# Patient Record
Sex: MALE | Race: WHITE | Employment: FULL TIME | URBAN - NONMETROPOLITAN AREA
[De-identification: names, ages, dates, MRNs, and addresses within clinical notes are randomized per-mention and may not be internally consistent; named-entity substitution may affect disease eponyms.]

---

## 2020-03-27 RX ORDER — METOPROLOL SUCCINATE 50 MG/1
50 TABLET, EXTENDED RELEASE ORAL DAILY
COMMUNITY

## 2020-03-27 RX ORDER — ROSUVASTATIN CALCIUM 5 MG/1
5 TABLET, COATED ORAL DAILY
COMMUNITY

## 2020-03-27 RX ORDER — TAMSULOSIN HYDROCHLORIDE 0.4 MG/1
0.4 CAPSULE ORAL DAILY
COMMUNITY
Start: 2020-03-19

## 2020-03-27 RX ORDER — FINASTERIDE 5 MG/1
5 TABLET, FILM COATED ORAL DAILY
COMMUNITY

## 2020-03-27 RX ORDER — CELECOXIB 200 MG/1
CAPSULE ORAL
COMMUNITY
Start: 2018-09-20 | End: 2020-11-23

## 2020-03-27 RX ORDER — LISINOPRIL 20 MG/1
20 TABLET ORAL DAILY
COMMUNITY

## 2020-03-27 RX ORDER — IBUPROFEN 400 MG/1
TABLET ORAL
COMMUNITY

## 2020-11-23 ENCOUNTER — OFFICE VISIT (OUTPATIENT)
Dept: PAIN MANAGEMENT | Age: 60
End: 2020-11-23
Payer: COMMERCIAL

## 2020-11-23 VITALS — DIASTOLIC BLOOD PRESSURE: 88 MMHG | SYSTOLIC BLOOD PRESSURE: 145 MMHG | RESPIRATION RATE: 16 BRPM | HEIGHT: 70 IN

## 2020-11-23 PROCEDURE — G8427 DOCREV CUR MEDS BY ELIG CLIN: HCPCS | Performed by: PHYSICAL MEDICINE & REHABILITATION

## 2020-11-23 PROCEDURE — 1036F TOBACCO NON-USER: CPT | Performed by: PHYSICAL MEDICINE & REHABILITATION

## 2020-11-23 PROCEDURE — 99204 OFFICE O/P NEW MOD 45 MIN: CPT | Performed by: PHYSICAL MEDICINE & REHABILITATION

## 2020-11-23 PROCEDURE — 3017F COLORECTAL CA SCREEN DOC REV: CPT | Performed by: PHYSICAL MEDICINE & REHABILITATION

## 2020-11-23 PROCEDURE — G8484 FLU IMMUNIZE NO ADMIN: HCPCS | Performed by: PHYSICAL MEDICINE & REHABILITATION

## 2020-11-23 PROCEDURE — G8421 BMI NOT CALCULATED: HCPCS | Performed by: PHYSICAL MEDICINE & REHABILITATION

## 2020-11-23 ASSESSMENT — ENCOUNTER SYMPTOMS
EYES NEGATIVE: 1
BACK PAIN: 1
NAUSEA: 0
CONSTIPATION: 0
RESPIRATORY NEGATIVE: 1
ALLERGIC/IMMUNOLOGIC NEGATIVE: 1

## 2020-11-23 NOTE — PROGRESS NOTES
PAIN MANAGEMENTNEW PATIENT CONSULTATION  11/23/20    Kiki Chung  1960    ReferringProvider:  No referring provider defined for this encounter. Primary Care Physician:  Mari Petit  [address] [place] [address]    HPIinformation obtained from the patient as well as the Comprehensive Pain ManagementHealth Questionnaire filled out by the patient. The questionnaire will be scannedinto the electronic chart and PMH, PSH, meds, and allergies will be updates accordingly. Subjective:       CHIEF COMPLAINT:  This is a58 y.o. 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 [place]; TENS unit at home)      PAIN HPI:    Pain in R gluteal does not  Radiate      Worse with  Sitting  More than  Walking      notes  Aches  All pain     had L Lumbar radiculopathy 2012  Had discectomy good improvement    Says motrin 600  BID -TID  Works as well as Clelebrex and does get moderate relief  No known Gi upset       Location: R back gluteal   Location Modifier: -  Severity of Pain: 6  Duration of Pain: Intermittent  Frequency of Pain: Intermittent  Aggravating Factors: -, Stretching, Bending, Standing, Walking, Stairs, Exercise, Kneeling and Squatting  Limiting Behavior: no  Relieving Factors: Heat, Cold and Medication -  Result of Injury: no  Work Related Injury: no  [place] of Worker Compensation Case: no      Prior evaluation:    Previous lower back problems:No    Previous workup: No   History of surgery in painful area:No    Previous pain medication trials have included:       Opioids: -     NSAID's:-     Muscle relaxants: -     Neuropathicpain meds: -    Previous Pain Management Physician: No   Nerve blocks, spinal injections:No   Typeof Pain Intervention -.     Date of last Pain Intervention  January /2020   Was there pain relief facility-administered medications prior to visit.         Past Medical History:   Diagnosis Date    Chronic pain     Hearing loss hearing aids in both ears    HTN (hypertension)     Hyperlipidemia     Lumbar herniated disc 2010    Osteoarthritis        Past Surgical History:   Procedure Laterality Date    BACK SURGERY      COLONOSCOPY  2014    HERNIA REPAIR  2017    [id number] [facility] SURGERY  2010    L3, L4 shaved the disc     TONSILLECTOMY AND ADENOIDECTOMY  1965       Family History   Problem Relation Age of Onset    Alzheimer's Disease Mother     Alcohol Abuse Father     Cancer Father     Hypertension Father      Social History     Socioeconomic History    Marital status:      Spouse name: None    Number of children: None    Years of education: None    Highest education level: None   Occupational History    None   Social Needs    Financial resource strain: None    Food insecurity     Worry: None     Inability: None    Transportation needs     Medical: None     Non-medical: None   Tobacco Use    Smoking status: Never Smoker    Smokeless tobacco: Never Used   Substance and Sexual Activity    Alcohol use: Not Currently    Drug use: Never    Sexual activity: None   Lifestyle    Physical activity     Days per week: None     Minutes per session: None    Stress: None   Relationships    Social connections     Talks on phone: None     Gets together: None     Attends Mosque service: None     Active member of club or organization: None     Attends meetings of clubs or organizations: None     Relationship status: None    Intimate partner violence     Fear of current or ex partner: None     Emotionally abused: None     Physically abused: None     Forced sexual activity: None   Other Topics Concern    None   Social History Narrative    None         Objective:     Physical Exam:  Vitals:    11/23/20 1401   BP: (!) 145/88   Site: Left Upper Arm   Position: Sitting   Cuff Size: Large Adult   Resp: 16   Height: 5' 10\" (1.778 m)     Pain Score:   7    Physical Exam  Constitutional:       Appearance: He is well-developed. HENT:      Head: Normocephalic and atraumatic. Cardiovascular:      Pulses: Normal pulses. Comments: Warm extremities. Normal capillary refill. Pulmonary:      Effort: Pulmonary effort is normal.   Abdominal:      General: Abdomen is flat. Palpations: Abdomen is soft. Skin:     General: Skin is warm and dry. Neurological:      General: No focal deficit present. Mental Status: He is alert and oriented to person, place, and time. Cranial Nerves: No cranial nerve deficit. Sensory: No sensory deficit. Motor: No atrophy or abnormal muscle tone. Deep Tendon Reflexes: Reflexes are normal and symmetric. Psychiatric:         Mood and Affect: Mood normal.         Speech: Speech normal.         Behavior: Behavior normal.         Back Exam     Tenderness   The patient is experiencing tenderness in the lumbar. Range of Motion   Extension: normal   Flexion: normal   Lateral bend right: normal   Lateral bend left: normal   Rotation right: normal   Rotation left: normal     Muscle Strength   Right Quadriceps:  5/5   Left Quadriceps:  5/5   Right Hamstrings:  5/5   Left Hamstrings:  5/5     Tests   Straight leg raise right: positive  Straight leg raise left: negative    Other   Toe walk: normal  Heel walk: normal  Sensation: normal  Gait: normal     Comments:  +slump R   mild + vladimir  From L  Abduction to right              Labs: No results found for: WBC, HGB, HCT, PLT, CHOL, TRIG, HDL, LDLDIRECT, ALT, AST, NA, K, CL, CREATININE, BUN, CO2, TSH, PSA, INR, GLUF, LABA1C, LABMICR    Assessment: This is a 63 y.o. male with the following diagnosis:     Pain Diagnoses:  1. Lumbar radiculopathy    2. Bilateral hearing loss, unspecified hearing loss type    3. Lumbosacral spondylosis without myelopathy    4.  Sacroiliac joint dysfunction of right side    5. Lumbar post-laminectomy syndrome        Medical/ Psychological Comorbidities:  As listed in the past medical and surgical history    Functional Limitations secondary to the above problems:  Chronic painlimits function and quality of life    Plan:   R L3,4  TFE x2   continue motrin 600 BID  ? Inversion table    Work is mild  During  Winter  Plans retire 8 months  Modify   Good body mechanics    Core exercise if help   1. Meds:   New Prescriptions    No medications on file      No orders of the defined types were placed in this encounter. Controlled Substances Monitoring:    OARRS report was reviewed for [place], [place]. Pt educated about the risks of taking opiates, including increasedsedation, constipation, slowed breathing, tolerance, dependence, and addiction. No orders of the defined types were placed in this encounter. No follow-ups on file. The patient expressed understanding of the above assessment and plan. Totaltime spent face to face with patient was 35 minutes inwhich  50% or more of the time was spent in counseling, education about risk andbenefits of the above plan, and coordination of care.

## 2020-11-23 NOTE — PATIENT INSTRUCTIONS
the injection done. If they cannot stay, the injection will be rescheduled. The valium may affect your judgment following the procedure and driving a vehicle within 24 hours after the sedation could be dangerous. 6.  Wear simple loose clothing, which can be easily changed. 7.  Leave jewelry (including rings) and other valuables at home. 8.  You will be asked to sign several forms prior to surgery; patients under the age of 25 must have a parent or legal guardian sign the permit to be able to do the procedure. 9.  You must have finished any antibiotic prescribed for recent infections. If required, please take pre-procedure antibiotic or other pre-procedure medications as instructed. 10. Bring inhalers and pain medications with you to your procedure. 11. Bring your MRI/CT films if they were done outside of the [facility]. 12. If you should develop a cold, sore throat, cough, fever or other new indication of illness or infection, or are started on antibiotics within 2 weeks of the scheduled procedure, please notify the [facility] office as early as possible at [phone]. If calling after 4:30pm the day prior to your scheduled procedure please contact [phone] and Leave a Voice Message.

## 2020-12-15 ENCOUNTER — APPOINTMENT (OUTPATIENT)
Dept: GENERAL RADIOLOGY | Age: 60
End: 2020-12-15
Attending: PHYSICAL MEDICINE & REHABILITATION
Payer: COMMERCIAL

## 2020-12-15 ENCOUNTER — HOSPITAL ENCOUNTER (OUTPATIENT)
Age: 60
Setting detail: OUTPATIENT SURGERY
Discharge: HOME | End: 2020-12-15
Attending: PHYSICAL MEDICINE & REHABILITATION | Admitting: PHYSICAL MEDICINE & REHABILITATION
Payer: COMMERCIAL

## 2020-12-15 VITALS
SYSTOLIC BLOOD PRESSURE: 170 MMHG | TEMPERATURE: 98 F | HEIGHT: 70 IN | OXYGEN SATURATION: 98 % | BODY MASS INDEX: 32.21 KG/M2 | HEART RATE: 58 BPM | WEIGHT: 225 LBS | RESPIRATION RATE: 18 BRPM | DIASTOLIC BLOOD PRESSURE: 98 MMHG

## 2020-12-15 PROBLEM — M54.16 LUMBAR RADICULITIS: Status: ACTIVE | Noted: 2020-12-15

## 2020-12-15 PROBLEM — M47.816 LUMBAR SPONDYLOSIS: Status: ACTIVE | Noted: 2020-12-15

## 2020-12-15 PROCEDURE — 2709999900 HC NON-CHARGEABLE SUPPLY: Performed by: PHYSICAL MEDICINE & REHABILITATION

## 2020-12-15 PROCEDURE — 3600000057 HC PAIN LEVEL 4 ADDL 15 MIN: Performed by: PHYSICAL MEDICINE & REHABILITATION

## 2020-12-15 PROCEDURE — 3209999900 FLUORO FOR SURGICAL PROCEDURES

## 2020-12-15 PROCEDURE — 2500000003 HC RX 250 WO HCPCS: Performed by: PHYSICAL MEDICINE & REHABILITATION

## 2020-12-15 PROCEDURE — 6360000004 HC RX CONTRAST MEDICATION: Performed by: PHYSICAL MEDICINE & REHABILITATION

## 2020-12-15 PROCEDURE — 7100000010 HC PHASE II RECOVERY - FIRST 15 MIN: Performed by: PHYSICAL MEDICINE & REHABILITATION

## 2020-12-15 PROCEDURE — 2580000003 HC RX 258: Performed by: PHYSICAL MEDICINE & REHABILITATION

## 2020-12-15 PROCEDURE — 6360000002 HC RX W HCPCS: Performed by: PHYSICAL MEDICINE & REHABILITATION

## 2020-12-15 PROCEDURE — 3600000056 HC PAIN LEVEL 4 BASE: Performed by: PHYSICAL MEDICINE & REHABILITATION

## 2020-12-15 PROCEDURE — 64493 INJ PARAVERT F JNT L/S 1 LEV: CPT | Performed by: PHYSICAL MEDICINE & REHABILITATION

## 2020-12-15 PROCEDURE — 64494 INJ PARAVERT F JNT L/S 2 LEV: CPT | Performed by: PHYSICAL MEDICINE & REHABILITATION

## 2020-12-15 RX ORDER — SODIUM CHLORIDE 9 MG/ML
INJECTION INTRAVENOUS PRN
Status: DISCONTINUED | OUTPATIENT
Start: 2020-12-15 | End: 2020-12-15 | Stop reason: ALTCHOICE

## 2020-12-15 RX ORDER — BUPIVACAINE HYDROCHLORIDE 5 MG/ML
INJECTION, SOLUTION EPIDURAL; INTRACAUDAL PRN
Status: DISCONTINUED | OUTPATIENT
Start: 2020-12-15 | End: 2020-12-15 | Stop reason: ALTCHOICE

## 2020-12-15 RX ORDER — DEXAMETHASONE SODIUM PHOSPHATE 10 MG/ML
INJECTION INTRAMUSCULAR; INTRAVENOUS PRN
Status: DISCONTINUED | OUTPATIENT
Start: 2020-12-15 | End: 2020-12-15 | Stop reason: ALTCHOICE

## 2020-12-15 ASSESSMENT — PAIN - FUNCTIONAL ASSESSMENT: PAIN_FUNCTIONAL_ASSESSMENT: 0-10

## 2020-12-15 NOTE — H&P
.            Signed        Expand AllCollapse All     Show:Clear all  [x]Manual[x]Template[]Copied    Added by:  [x]Eduardo Estevez MD    []Cira for details  PAIN MANAGEMENTNEW PATIENT CONSULTATION  11/23/20     Rae Roxann  1960     ReferringProvider:  No referring provider defined for this encounter. Primary Care Physician:  Shawna Gottlieb  [address] [place] [id number]     HPIinformation obtained from the patient as well as the Comprehensive Pain ManagementHealth Questionnaire filled out by the patient. The questionnaire will be scannedinto the electronic chart and PMH, PSH, meds, and allergies will be updates accordingly.      Subjective:       CHIEF COMPLAINT:  This is a58 y.o. 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 [place]; TENS unit at home)        PAIN HPI:     Pain in R gluteal does not  Radiate      Worse with  Sitting  More than  Walking      notes  Aches  All pain      had L Lumbar radiculopathy 2012  Had discectomy good improvement     Says motrin 600  BID -TID  Works as well as Clelebrex and does get moderate relief  No known Gi upset       Location: R back gluteal   Location Modifier: -  Severity of Pain: 6  Duration of Pain: Intermittent  Frequency of Pain: Intermittent  Aggravating Factors: -, Stretching, Bending, Standing, Walking, Stairs, Exercise, Kneeling and Squatting  Limiting Behavior: no  Relieving Factors: Heat, Cold and Medication -  Result of Injury: no  Work Related Injury: no  [place] of Worker Compensation Case: no        Prior evaluation:               Previous lower back problems:No               Previous workup: No              History of surgery in painful area:No     Previous pain medication trials have included:                             Opioids: - NSAID's:-                 Muscle relaxants: -                 Neuropathicpain meds: -     Previous Pain Management Physician: No              Nerve blocks, spinal injections:No              Typeof Pain Intervention -. Date of last Pain Intervention  January /2020              Was there pain relief from Pain Intervention: No.               How long was your pain relief from the Pain Intervention 2years. Sleep:               Sleep disturbance: No              Difficultyfalling asleep:  No              Feels fatiguedmuch of the time: No              Wakes uprested: No              Awakens in themiddle of the night: No              Takes sleepingmedication: No     Mental health:               Patient feels - secondary to their current pain problems as described above. H/O depression and anxiety: No              Patient is not seeing psychologist orpsychiatrist              Abuse history? No     Employed? Yes  Alcohol use?: No  Tobacco use?: No  Marijuana use?: No  Illicit drug use?: No     Imaging: Reviewed available imagingin our system with the patient. No results found. Referring physician records reviewed. Review of Systems   Constitutional: Positive for fatigue. HENT: Negative. Eyes: Negative. Respiratory: Negative. Cardiovascular: Negative. Gastrointestinal: Negative for constipation and nausea. Endocrine: Negative. Genitourinary: Negative for difficulty urinating. Musculoskeletal: Positive for arthralgias, back pain and myalgias. Skin: Negative. Allergic/Immunologic: Negative. Neurological: Positive for weakness and numbness. Hematological: Negative. Psychiatric/Behavioral: Positive for sleep disturbance. All other systems reviewed and are negative.         No Known Allergies     Medications Prior to Visit          Outpatient Medications Prior to Visit   Medication Sig Dispense Refill    rosuvastatin (CRESTOR) 5 MG tablet Take 5 mg by mouth daily         ibuprofen (ADVIL;MOTRIN) 400 MG tablet 1 tablet with food or milk as needed        lisinopril (PRINIVIL;ZESTRIL) 20 MG tablet Take 20 mg by mouth daily         finasteride (PROSCAR) 5 MG tablet Take 5 mg by mouth daily         metoprolol succinate (TOPROL XL) 50 MG extended release tablet Take 50 mg by mouth daily         tamsulosin (FLOMAX) 0.4 MG capsule Take 0.4 mg by mouth daily         celecoxib (CELEBREX) 200 MG capsule 1 capsule with food          No facility-administered medications prior to visit.              Past Medical History        Past Medical History:   Diagnosis Date    Chronic pain      Hearing loss hearing aids in both ears    HTN (hypertension)      Hyperlipidemia      Lumbar herniated disc 2010    Osteoarthritis              Past Surgical History         Past Surgical History:   Procedure Laterality Date    BACK SURGERY        COLONOSCOPY   2014    HERNIA REPAIR   2017     [id number] [facility] SURGERY   2010     L3, L4 shaved the disc     TONSILLECTOMY AND ADENOIDECTOMY   1965            Family History         Family History   Problem Relation Age of Onset    Alzheimer's Disease Mother      Alcohol Abuse Father      Cancer Father      Hypertension Father           Social History   Social History            Socioeconomic History    Marital status:        Spouse name: None    Number of children: None    Years of education: None    Highest education level: None   Occupational History    None   Social Needs    Financial resource strain: None    Food insecurity       Worry: None       Inability: None    Transportation needs       Medical: None       Non-medical: None   Tobacco Use    Smoking status: Never Smoker    Smokeless tobacco: Never Used   Substance and Sexual Activity    Alcohol use: Not Currently    Drug use: Never    Sexual activity: None   Lifestyle    Physical activity       Days per week: None Minutes per session: None    Stress: None   Relationships    Social connections       Talks on phone: None       Gets together: None       Attends Restorationism service: None       Active member of club or organization: None       Attends meetings of clubs or organizations: None       Relationship status: None    Intimate partner violence       Fear of current or ex partner: None       Emotionally abused: None       Physically abused: None       Forced sexual activity: None   Other Topics Concern    None   Social History Narrative    None               Objective:      Physical Exam:  Vitals       Vitals:     11/23/20 1401   BP: (!) 145/88   Site: Left Upper Arm   Position: Sitting   Cuff Size: Large Adult   Resp: 16   Height: 5' 10\" (1.778 m)         Pain Score:   7     Physical Exam  Constitutional:       Appearance: He is well-developed. HENT:      Head: Normocephalic and atraumatic. Cardiovascular:      Pulses: Normal pulses. Comments: Warm extremities. Normal capillary refill. Pulmonary:      Effort: Pulmonary effort is normal.   Abdominal:      General: Abdomen is flat. Palpations: Abdomen is soft. Skin:     General: Skin is warm and dry. Neurological:      General: No focal deficit present. Mental Status: He is alert and oriented to person, place, and time. Cranial Nerves: No cranial nerve deficit. Sensory: No sensory deficit. Motor: No atrophy or abnormal muscle tone. Deep Tendon Reflexes: Reflexes are normal and symmetric. Psychiatric:         Mood and Affect: Mood normal.         Speech: Speech normal.         Behavior: Behavior normal.            Back Exam      Tenderness   The patient is experiencing tenderness in the lumbar.      Range of Motion   Extension: normal   Flexion: normal   Lateral bend right: normal   Lateral bend left: normal   Rotation right: normal   Rotation left: normal      Muscle Strength   Right Quadriceps:  5/5   Left Quadriceps:  5/5 Right Hamstrings:  5/5   Left Hamstrings:  5/5      Tests   Straight leg raise right: positive  Straight leg raise left: negative     Other   Toe walk: normal  Heel walk: normal  Sensation: normal  Gait: normal      Comments:  +slump R   mild + vladimir  From L  Abduction to right                  Labs: No results found for: WBC, HGB, HCT, PLT, CHOL, TRIG, HDL, LDLDIRECT, ALT, AST, NA, K, CL, CREATININE, BUN, CO2, TSH, PSA, INR, GLUF, LABA1C, LABMICR     Assessment: This is a 63 y.o. male with the following diagnosis:      Pain Diagnoses:  1. Lumbar radiculopathy    2. Bilateral hearing loss, unspecified hearing loss type    3. Lumbosacral spondylosis without myelopathy    4. Sacroiliac joint dysfunction of right side    5. Lumbar post-laminectomy syndrome          Medical/ Psychological Comorbidities:  As listed in the past medical and surgical history     Functional Limitations secondary to the above problems:  Chronic painlimits function and quality of life     Plan:   R L3,4  TFE x2   continue motrin 600 BID  ? Inversion table    Work is mild  During  Winter  Plans retire 8 months  Modify   Good body mechanics    Core exercise if help   1. Meds:       New Prescriptions     No medications on file        Encounter Medications    No orders of the defined types were placed in this encounter. Controlled Substances Monitoring:    OARRS report was reviewed for [place], [place]. Pt educated about the risks of taking opiates, including increasedsedation, constipation, slowed breathing, tolerance, dependence, and addiction. No orders of the defined types were placed in this encounter. No follow-ups on file. The patient expressed understanding of the above assessment and plan. Totaltime spent face to face with patient was 35 minutes inwhich  50% or more of the time was spent in counseling, education about risk andbenefits of the above plan, and coordination of care.

## 2020-12-15 NOTE — OP NOTE
procedure. SEDATION:   No conscious sedation was performed during the procedure. The patient remained awake and conversed throughout the procedure. The patient underwent pulse oximetry and blood pressure monitoring independently by a trained observer, as well as by a physician. PROCEDURE:  Under image-intensifier control, a 22 gauge needle x 5 inch spinal needle was guided successfully into the epidural space employing a posterior lateral/oblique approach. Needle aspiration was negative for heme or CSF. Instillation of  .5 mL of Omnipaque 240 contrast medium opacified the spinal nerve and demonstrated contiguous flow into the RL 3 epidural space. No vascular spread was noted. Digital subtraction was not employed to evaluate for vascular spread. The patient was monitored for any untoward reaction to contrast medium before proceeding with procedure #2. The patient did not report pain reproduction in a concordant distribution. Following needle position verification, a test dose of .5 mL of sterile lidocaine 0.5% was administered and patient monitored for any adverse effects. Then, .5 mL of   was instilled into the epidural space and the patient's response was again monitored. Finally, Dexamethasone (Decadron 10 mg/mL) 1 ml of 0.5% bupivacaine was then instilled. The patient's response was again monitored. The spinal needle was removed. Instillation of  .5 mL of Omnipaque 240 contrast medium opacified the spinal nerve and demonstrated contiguous flow into the RL4 epidural space. No vascular spread was noted. Digital subtraction was not employed to evaluate for vascular spread. The patient was monitored for any untoward reaction to contrast medium before proceeding with procedure #2. The patient did not report pain reproduction in a concordant distribution.     Following needle position verification, a test dose of .5 mL of sterile lidocaine 0.5% was administered and patient monitored for any adverse effects. Then, .5 mL of   was instilled into the epidural space and the patient's response was again monitored. Finally, Dexamethasone (Decadron 10 mg/mL) 1 ml of 0.5% bupivacaine was then instilled. The patient's response was again monitored. The spinal needle was removed. The patient tolerated the procedure well and without complications and was noted to be in stable condition prior to discharge from the procedure center with discharge instructions. EBL: no blood loss    SPECIMEN: none    IMPRESSIONS:  1. RL3,4 transforaminal epidurogram, epidural anesthesia and epidural steroid injection procedures accomplished without incident. RECOMMENDATIONS:  1. Complete and return Post-Procedure Pain and Activity Diary.   2. Contact the [address] for symptom exacerbation, fever or unusual symptoms. 3. Post-procedure care according to verbal and written discharge instructions    POST-PROCEDURE EPIDUROGRAPHY INTERPRETATION:    EXAMINATION: AP, lateral, and oblique views    FLUORO TIME: 23 seconds    DISCUSSION: Spot views of the spine reveal normal alignment and segmentation. Spinal needle is positioned at the RL3,4 neuroforamin. Contrast spreads and outlines the RL3,4 nerve/ neuroforamin and epidural space. The epidurogram reveals excellent contrast flow. Visualized spine reveals See radiology report. Soft tissues reveal no abnormalities. IMPRESSION: RL3,4 transforaminal epidurogram/epineurogram reveals satisfactory needle position and contrast spread.      Electronically signed by La Lopez MD on 12/15/2020 at 8:31 AM

## 2021-01-05 ENCOUNTER — APPOINTMENT (OUTPATIENT)
Dept: GENERAL RADIOLOGY | Age: 61
End: 2021-01-05
Attending: PHYSICAL MEDICINE & REHABILITATION
Payer: COMMERCIAL

## 2021-01-05 ENCOUNTER — HOSPITAL ENCOUNTER (OUTPATIENT)
Age: 61
Setting detail: OUTPATIENT SURGERY
Discharge: HOME | End: 2021-01-05
Attending: PHYSICAL MEDICINE & REHABILITATION | Admitting: PHYSICAL MEDICINE & REHABILITATION
Payer: COMMERCIAL

## 2021-01-05 VITALS
HEART RATE: 65 BPM | DIASTOLIC BLOOD PRESSURE: 74 MMHG | SYSTOLIC BLOOD PRESSURE: 140 MMHG | TEMPERATURE: 98.2 F | WEIGHT: 225 LBS | BODY MASS INDEX: 32.21 KG/M2 | RESPIRATION RATE: 16 BRPM | OXYGEN SATURATION: 98 % | HEIGHT: 70 IN

## 2021-01-05 PROCEDURE — 6360000004 HC RX CONTRAST MEDICATION: Performed by: PHYSICAL MEDICINE & REHABILITATION

## 2021-01-05 PROCEDURE — 64483 NJX AA&/STRD TFRM EPI L/S 1: CPT | Performed by: PHYSICAL MEDICINE & REHABILITATION

## 2021-01-05 PROCEDURE — 64484 NJX AA&/STRD TFRM EPI L/S EA: CPT | Performed by: PHYSICAL MEDICINE & REHABILITATION

## 2021-01-05 PROCEDURE — 7100000011 HC PHASE II RECOVERY - ADDTL 15 MIN: Performed by: PHYSICAL MEDICINE & REHABILITATION

## 2021-01-05 PROCEDURE — 2709999900 HC NON-CHARGEABLE SUPPLY: Performed by: PHYSICAL MEDICINE & REHABILITATION

## 2021-01-05 PROCEDURE — 2500000003 HC RX 250 WO HCPCS: Performed by: PHYSICAL MEDICINE & REHABILITATION

## 2021-01-05 PROCEDURE — 3209999900 FLUORO FOR SURGICAL PROCEDURES

## 2021-01-05 PROCEDURE — 2580000003 HC RX 258: Performed by: PHYSICAL MEDICINE & REHABILITATION

## 2021-01-05 PROCEDURE — 6360000002 HC RX W HCPCS: Performed by: PHYSICAL MEDICINE & REHABILITATION

## 2021-01-05 PROCEDURE — 3600000056 HC PAIN LEVEL 4 BASE: Performed by: PHYSICAL MEDICINE & REHABILITATION

## 2021-01-05 PROCEDURE — 7100000010 HC PHASE II RECOVERY - FIRST 15 MIN: Performed by: PHYSICAL MEDICINE & REHABILITATION

## 2021-01-05 RX ORDER — SODIUM CHLORIDE 9 MG/ML
INJECTION INTRAVENOUS PRN
Status: DISCONTINUED | OUTPATIENT
Start: 2021-01-05 | End: 2021-01-05 | Stop reason: ALTCHOICE

## 2021-01-05 RX ORDER — DEXAMETHASONE SODIUM PHOSPHATE 10 MG/ML
INJECTION INTRAMUSCULAR; INTRAVENOUS PRN
Status: DISCONTINUED | OUTPATIENT
Start: 2021-01-05 | End: 2021-01-05 | Stop reason: ALTCHOICE

## 2021-01-05 RX ORDER — BUPIVACAINE HYDROCHLORIDE 5 MG/ML
INJECTION, SOLUTION EPIDURAL; INTRACAUDAL PRN
Status: DISCONTINUED | OUTPATIENT
Start: 2021-01-05 | End: 2021-01-05 | Stop reason: ALTCHOICE

## 2021-01-05 ASSESSMENT — PAIN SCALES - GENERAL: PAINLEVEL_OUTOF10: 0

## 2021-01-05 NOTE — OP NOTE
Operative Note  TRANSFORAMINAL EPIDURAL STEROID INJECTION    1/5/21  The patient was counseled at length about the risks of nehemias Covid-19 during their perioperative period and any recovery window from their procedure. The patient was made aware that nehemias Covid-19  may worsen their prognosis for recovering from their procedure  and lend to a higher morbidity and/or mortality risk. All material risks, benefits, and reasonable alternatives including postponing the procedure were discussed. The patient does wish to proceed with the procedure at this time. Surgeon: Nicho Cunningham MD    Pre-operative Diagnosis:   Patient Active Problem List   Diagnosis Code    Hearing loss H91.90    Lumbar radiculitis M54.16    Lumbar spondylosis M47.816       Post-operative Diagnosis: Same    Assistants: none    This is a 63 y.o. male patient with pain in the Back, right leg. Previous treatment and examination findings are noted in the H&P.RL3,4 transforaminal epidural injection has been requested for diagnostic and therapeutic reasons. Conservative treatment was ineffective i.e.: ice, NSAIDS, rest, narcotic medication, chiropractic care, physical therapy and message therapy. Patient is unable to perform the following ADL's: ambulating and grooming     Pain Assessment: 0-10  Pain Level: 4                Last Plain films: 202020      EXAMINATION:  1. RL3,4 transforaminal radiculogram/epidurogram.   2. RL3,4 transforaminal epidural anesthetic injection. 3. RL3,4 transforaminal epidural steroid injection. CONSENT: Written consent was obtained from the patient on preprinted consent form after explaining the procedure, indications, potential complications and outcomes. Alternative treatments were also discussed. DISCUSSION: The patient was sterilely prepped and draped in the usual fashion in the prone position. Time out was verified for correct patient, side, level and procedure.     SEDATION:   No conscious sedation was performed during the procedure. The patient remained awake and conversed throughout the procedure. The patient underwent pulse oximetry and blood pressure monitoring independently by a trained observer, as well as by a physician. PROCEDURE:  Under image-intensifier control, a 22 gauge needle x 5 inch spinal needle was guided successfully into the epidural space employing a posterior lateral/oblique approach. Needle aspiration was negative for heme or CSF. Instillation of  .5 mL of Omnipaque 240 contrast medium opacified the spinal nerve and demonstrated contiguous flow into theRL 3 epidural space. No vascular spread was noted. Digital subtraction was not employed to evaluate for vascular spread. The patient was monitored for any untoward reaction to contrast medium before proceeding with procedure #2. The patient did not report pain reproduction in a concordant distribution. Following needle position verification, a test dose of .5 mL of sterile lidocaine 0.5% was administered and patient monitored for any adverse effects. Then, 1 mL of   was instilled into the epidural space and the patient's response was again monitored. Finally, Dexamethasone (Decadron 10 mg/mL) 1 ml of 0.5% bupivacaine was then instilled. The patient's response was again monitored. The spinal needle was removed. Instillation of  .5 mL of Omnipaque 240 contrast medium opacified the spinal nerve and demonstrated contiguous flow into theRL4  epidural space. No vascular spread was noted. Digital subtraction was not employed to evaluate for vascular spread. The patient was monitored for any untoward reaction to contrast medium before proceeding with procedure #2. The patient did not report pain reproduction in a concordant distribution. Following needle position verification, a test dose of .5 mL of sterile lidocaine 0.5% was administered and patient monitored for any adverse effects.  Then, 1 mL of was instilled into the epidural space and the patient's response was again monitored. Finally, Dexamethasone (Decadron 10 mg/mL) 1 ml of 0.5% bupivacaine was then instilled. The patient's response was again monitored. The spinal needle was removed. The patient tolerated the procedure well and without complications and was noted to be in stable condition prior to discharge from the procedure center with discharge instructions. EBL: no blood loss    SPECIMEN: none    IMPRESSIONS:  1. RL3,4 transforaminal epidurogram, epidural anesthesia and epidural steroid injection procedures accomplished without incident. RECOMMENDATIONS:  1. Complete and return Post-Procedure Pain and Activity Diary.   2. Contact the [address] for symptom exacerbation, fever or unusual symptoms. 3. Post-procedure care according to verbal and written discharge instructions    POST-PROCEDURE EPIDUROGRAPHY INTERPRETATION:    EXAMINATION: AP, lateral, and oblique views    FLUORO TIME: 12 seconds    DISCUSSION: Spot views of the spine reveal normal alignment and segmentation. Spinal needle is positioned at the RL3,4 neuroforamin. Contrast spreads and outlines the RL3,4 nerve/ neuroforamin and epidural space. The epidurogram reveals excellent contrast flow. Visualized spine reveals See radiology report. Soft tissues reveal no abnormalities. IMPRESSION: RL3,4 transforaminal epidurogram/epineurogram reveals satisfactory needle position and contrast spread.      Electronically signed by Ashanti Petit MD on 1/5/2021 at 8:11 AM

## 2021-01-05 NOTE — INTERVAL H&P NOTE
I have interviewed and examined the patient and reviewed the recent History and Physical.  There have been no changes to the recent H&P documentation. The surgical consent form has been signed. Last anticoagulant medication use was:na    Premedication taken for contrast allergy? No    Valium taken for oral sedation? No    Outpatient Medications Marked as Taking for the 1/5/21 encounter [facility] Encounter)   Medication Sig Dispense Refill    rosuvastatin (CRESTOR) 5 MG tablet Take 5 mg by mouth daily       lisinopril (PRINIVIL;ZESTRIL) 20 MG tablet Take 20 mg by mouth daily       finasteride (PROSCAR) 5 MG tablet Take 5 mg by mouth daily       metoprolol succinate (TOPROL XL) 50 MG extended release tablet Take 50 mg by mouth daily       tamsulosin (FLOMAX) 0.4 MG capsule Take 0.4 mg by mouth daily          The patient understands the planned operation and its associated risks and benefits and agrees to proceed.         Electronically signed by Amilcar Damon MD on 1/5/2021 at 8:10 AM

## 2021-01-05 NOTE — H&P
.            Signed        Expand AllCollapse All     Show:Clear all  [x]Manual[x]Template[]Copied    Added by:  [x]Eduardo Guillen MD    []Cira for details  I have interviewed and examined the patient and reviewed the recent History and Physical.  There have been no changes to the recent H&P documentation. The surgical consent form has been signed. Last anticoagulant medication use was:na     Premedication taken for contrast allergy? No     Valium taken for oral sedation? No     Active Medications          Outpatient Medications Marked as Taking for the 12/15/20 encounter [facility] Encounter)   Medication Sig Dispense Refill    rosuvastatin (CRESTOR) 5 MG tablet Take 5 mg by mouth daily         ibuprofen (ADVIL;MOTRIN) 400 MG tablet 1 tablet with food or milk as needed        lisinopril (PRINIVIL;ZESTRIL) 20 MG tablet Take 20 mg by mouth daily         finasteride (PROSCAR) 5 MG tablet Take 5 mg by mouth daily         metoprolol succinate (TOPROL XL) 50 MG extended release tablet Take 50 mg by mouth daily         tamsulosin (FLOMAX) 0.4 MG capsule Take 0.4 mg by mouth daily                 The patient understands the planned operation and its associated risks and benefits and agrees to proceed. Electronically signed by Nicho Cunningham MD on 12/15/2020 at 8:30 AM                Nicho Cunningham MD   Physician   Specialty:  Physical Medicine and Rehab   H&P   Signed   Date of Service:  12/15/2020  8:29 AM         Related encounter: Admission (Discharged) from 12/15/2020 in [address]  all  [x]Manual[]Template[x]Copied    Added by:  [x]Eduardo Guillen MD    []Cira for details      . Signed          Expand AllCollapse All     Show:Clear all  [x]? Manual[x]? Template[]? Copied     Added by:  [x]? Nicho Cunningham MD     []? Cira for details  PAIN MANAGEMENTNEW PATIENT CONSULTATION  11/23/20     Neyda Rod  1960     ReferringProvider:  No referring provider defined for this encounter. Primary Care Physician:  Ty Harris  [address] [address]     HPIinformation obtained from the patient as well as the Comprehensive Pain ManagementHealth Questionnaire filled out by the patient. The questionnaire will be scannedinto the electronic chart and PMH, PSH, meds, and allergies will be updates accordingly. Subjective:       CHIEF COMPLAINT:  This is a58 y.o. male patientwho presents with a complaint of New Patient (right side low back pain, referred by Dr Paulette Rome) and Other (over past two years twice @ [place] [address], did dry needling as well, chiropractor at [facility] in [place]; TENS unit at home)        PAIN HPI:     Pain in R gluteal does not  Radiate      Worse with  Sitting  More than  Walking      notes  Aches  All pain      had L Lumbar radiculopathy 2012  Had discectomy good improvement     Says motrin 600  BID -TID  Works as well as Clelebrex and does get moderate relief  No known Gi upset       Location: R back gluteal   Location Modifier: -  Severity of Pain: 6  Duration of Pain: Intermittent  Frequency of Pain: Intermittent  Aggravating Factors: -, Stretching, Bending, Standing, Walking, Stairs, Exercise, Kneeling and Squatting  Limiting Behavior: no  Relieving Factors: Heat, Cold and Medication -  Result of Injury: no  Work Related Injury: no  Glynn of Worker Compensation Case: no        Prior evaluation:               Previous lower back problems:No               Previous workup: No              History of surgery in painful area:No     Previous pain medication trials have included:                             Opioids: -                 NSAID's:-                 Muscle relaxants: -                 Neuropathicpain meds: -     Previous Pain Management Physician: No              Nerve blocks, spinal injections:No              Typeof Pain Intervention -.                Date of last Pain Intervention  January /2020              Was there pain relief from Pain Intervention: No.               How long was your pain relief from the Pain Intervention 2years. Sleep:               Sleep disturbance: No              Difficultyfalling asleep:  No              Feels fatiguedmuch of the time: No              Wakes uprested: No              Awakens in themiddle of the night: No              Takes sleepingmedication: No     Mental health:               Patient feels - secondary to their current pain problems as described above. H/O depression and anxiety: No              Patient is not seeing psychologist orpsychiatrist              Abuse history? No     Employed? Yes  Alcohol use?: No  Tobacco use?: No  Marijuana use?: No  Illicit drug use?: No     Imaging: Reviewed available imagingin our system with the patient. No results found. Referring physician records reviewed. Review of Systems   Constitutional: Positive for fatigue. HENT: Negative. Eyes: Negative. Respiratory: Negative. Cardiovascular: Negative. Gastrointestinal: Negative for constipation and nausea. Endocrine: Negative. Genitourinary: Negative for difficulty urinating. Musculoskeletal: Positive for arthralgias, back pain and myalgias. Skin: Negative. Allergic/Immunologic: Negative. Neurological: Positive for weakness and numbness. Hematological: Negative. Psychiatric/Behavioral: Positive for sleep disturbance. All other systems reviewed and are negative.         No Known Allergies     Medications Prior to Visit               Outpatient Medications Prior to Visit   Medication Sig Dispense Refill    rosuvastatin (CRESTOR) 5 MG tablet Take 5 mg by mouth daily         ibuprofen (ADVIL;MOTRIN) 400 MG tablet 1 tablet with food or milk as needed        lisinopril (PRINIVIL;ZESTRIL) 20 MG tablet Take 20 mg by mouth daily         finasteride (PROSCAR) 5 MG tablet Take 5 mg by mouth daily         metoprolol succinate (TOPROL XL) 50 MG extended release tablet Take 50 mg by mouth daily         tamsulosin (FLOMAX) 0.4 MG capsule Take 0.4 mg by mouth daily         celecoxib (CELEBREX) 200 MG capsule 1 capsule with food          No facility-administered medications prior to visit.              Past Medical History           Past Medical History:   Diagnosis Date    Chronic pain      Hearing loss hearing aids in both ears    HTN (hypertension)      Hyperlipidemia      Lumbar herniated disc 2010    Osteoarthritis              Past Surgical History             Past Surgical History:   Procedure Laterality Date    BACK SURGERY        COLONOSCOPY   2014    HERNIA REPAIR   2017     [id number] [facility] SURGERY   2010     L3, L4 shaved the disc     TONSILLECTOMY AND ADENOIDECTOMY   1965            Family History             Family History   Problem Relation Age of Onset    Alzheimer's Disease Mother      Alcohol Abuse Father      Cancer Father      Hypertension Father           Social History   Social History                Socioeconomic History    Marital status:        Spouse name: None    Number of children: None    Years of education: None    Highest education level: None   Occupational History    None   Social Needs    Financial resource strain: None    Food insecurity       Worry: None       Inability: None    Transportation needs       Medical: None       Non-medical: None   Tobacco Use    Smoking status: Never Smoker    Smokeless tobacco: Never Used   Substance and Sexual Activity    Alcohol use: Not Currently    Drug use: Never    Sexual activity: None   Lifestyle    Physical activity       Days per week: None       Minutes per session: None    Stress: None   Relationships    Social connections       Talks on phone: None       Gets together: None       Attends Adventism service: None       Active member of club or organization: None       Attends meetings of vladimir  From L  Abduction to right                  Labs: No results found for: WBC, HGB, HCT, PLT, CHOL, TRIG, HDL, LDLDIRECT, ALT, AST, NA, K, CL, CREATININE, BUN, CO2, TSH, PSA, INR, GLUF, LABA1C, LABMICR     Assessment: This is a 63 y.o. male with the following diagnosis:      Pain Diagnoses:  1. Lumbar radiculopathy    2. Bilateral hearing loss, unspecified hearing loss type    3. Lumbosacral spondylosis without myelopathy    4. Sacroiliac joint dysfunction of right side    5. Lumbar post-laminectomy syndrome          Medical/ Psychological Comorbidities:  As listed in the past medical and surgical history     Functional Limitations secondary to the above problems:  Chronic painlimits function and quality of life     Plan:   R L3,4  TFE x2   continue motrin 600 BID  ? Inversion table    Work is mild  During  Winter  Plans retire 8 months  Modify   Good body mechanics    Core exercise if help   1. Meds:         New Prescriptions     No medications on file         Encounter Medications    No orders of the defined types were placed in this encounter. Controlled Substances Monitoring:    OARRS report was reviewed for [place], [place]. Pt educated about the risks of taking opiates, including increasedsedation, constipation, slowed breathing, tolerance, dependence, and addiction. No orders of the defined types were placed in this encounter. No follow-ups on file. The patient expressed understanding of the above assessment and plan. Totaltime spent face to face with patient was 35 minutes inwhich  50% or more of the time was spent in counseling, education about risk andbenefits of the above plan, and coordination of care.

## 2021-01-19 ENCOUNTER — OFFICE VISIT (OUTPATIENT)
Dept: PAIN MANAGEMENT | Age: 61
End: 2021-01-19
Payer: COMMERCIAL

## 2021-01-19 VITALS
WEIGHT: 230 LBS | OXYGEN SATURATION: 97 % | HEIGHT: 70 IN | DIASTOLIC BLOOD PRESSURE: 80 MMHG | SYSTOLIC BLOOD PRESSURE: 122 MMHG | BODY MASS INDEX: 32.93 KG/M2 | HEART RATE: 73 BPM

## 2021-01-19 DIAGNOSIS — M47.816 LUMBAR FACET JOINT SYNDROME: ICD-10-CM

## 2021-01-19 DIAGNOSIS — M54.16 LUMBAR RADICULOPATHY: Primary | ICD-10-CM

## 2021-01-19 DIAGNOSIS — M47.817 LUMBOSACRAL SPONDYLOSIS WITHOUT MYELOPATHY: ICD-10-CM

## 2021-01-19 PROCEDURE — G8484 FLU IMMUNIZE NO ADMIN: HCPCS | Performed by: PHYSICAL MEDICINE & REHABILITATION

## 2021-01-19 PROCEDURE — G8427 DOCREV CUR MEDS BY ELIG CLIN: HCPCS | Performed by: PHYSICAL MEDICINE & REHABILITATION

## 2021-01-19 PROCEDURE — 1036F TOBACCO NON-USER: CPT | Performed by: PHYSICAL MEDICINE & REHABILITATION

## 2021-01-19 PROCEDURE — G8417 CALC BMI ABV UP PARAM F/U: HCPCS | Performed by: PHYSICAL MEDICINE & REHABILITATION

## 2021-01-19 PROCEDURE — 3017F COLORECTAL CA SCREEN DOC REV: CPT | Performed by: PHYSICAL MEDICINE & REHABILITATION

## 2021-01-19 PROCEDURE — 99214 OFFICE O/P EST MOD 30 MIN: CPT | Performed by: PHYSICAL MEDICINE & REHABILITATION

## 2021-01-19 ASSESSMENT — ENCOUNTER SYMPTOMS
EYES NEGATIVE: 1
ALLERGIC/IMMUNOLOGIC NEGATIVE: 1
BACK PAIN: 1
NAUSEA: 0
CONSTIPATION: 0
RESPIRATORY NEGATIVE: 1

## 2021-01-19 NOTE — PROGRESS NOTES
PAIN MANAGEMENT FOLLOW-UP NOTE  1/19/21    CHIEF COMPLAINT: This is a58 y.o. male patientwho returns to the [facility] Management Clinic with a history of Lower Back Pain (post TFE, lower right sided back pain. pt states pain has decreased since prior to injections )      PAIN HPI:Vincent Potter returns today for  reevaluation. Since the visit, the patient reports that the pain is not changed. 40 % better in terms of R lumbar pain    The  Actual spine  Pain is  Gone completely. Location: Back  Location Modifier: -  Severity of Pain: 2-  Duration of Pain: Intermittent  Frequency of Pain: Intermittent  Aggravating Factors: -  Limiting Behavior: Standing   Relieving Factors: relaxation        Previous pain medication trials have included:          Mental health:    Patient feels - secondary to their current pain problems as described above. H/O depression and anxiety: No   Patient is not seeing psychologist orpsychiatrist   Abuse history? No    Employed? No    ANALGESIA:   Are your Current Pain medication (s) helping to decrease pain? No.   Current Pain score:      ADVERSE AFFECTS:   Medication Side Effects: No.    ACTIVITY:  Are you able to be more active with your pain medications? No      ABERRANT BEHAVIORS SINCE LAST VISIT? No    Review of Systems   Constitutional: Positive for fatigue. HENT: Negative. Eyes: Negative. Respiratory: Negative. Cardiovascular: Negative. Gastrointestinal: Negative for constipation and nausea. Endocrine: Negative. Genitourinary: Negative for difficulty urinating. Musculoskeletal: Positive for arthralgias, back pain and myalgias. Skin: Negative. Allergic/Immunologic: Negative. Neurological: Positive for weakness and numbness. Hematological: Negative. Psychiatric/Behavioral: Positive for sleep disturbance. All other systems reviewed and are negative.        No Known Allergies    Outpatient Medications Prior to Visit   Medication Sig Dispense Refill  rosuvastatin (CRESTOR) 5 MG tablet Take 5 mg by mouth daily       ibuprofen (ADVIL;MOTRIN) 400 MG tablet 1 tablet with food or milk as needed      lisinopril (PRINIVIL;ZESTRIL) 20 MG tablet Take 20 mg by mouth daily       finasteride (PROSCAR) 5 MG tablet Take 5 mg by mouth daily       metoprolol succinate (TOPROL XL) 50 MG extended release tablet Take 50 mg by mouth daily       tamsulosin (FLOMAX) 0.4 MG capsule Take 0.4 mg by mouth daily        No facility-administered medications prior to visit.         Past Medical History:   Diagnosis Date    Chronic pain     Hearing loss hearing aids in both ears    HTN (hypertension)     Hyperlipidemia     Lumbar herniated disc 2010    Osteoarthritis        Past Surgical History:   Procedure Laterality Date    BACK SURGERY      COLONOSCOPY  2014   [facility] HERNIA REPAIR  2017    [id number] [facility] SURGERY  2010    L3, L4 shaved the disc     PAIN MANAGEMENT PROCEDURE Right 12/15/2020    Right L3 L4 TRANSFORAMINAL performed by Lavonne Tidwell MD at [address] [place] [address] Right 1/5/2021    Right L3 L4 TRANSFORAMINAL performed by Lavonen Tidwell MD at [place] [address]     Family History   Problem Relation Age of Onset    Alzheimer's Disease Mother     Alcohol Abuse Father     Cancer Father     Hypertension Father      Social History     Socioeconomic History    Marital status:      Spouse name: None    Number of children: None    Years of education: None    Highest education level: None   Occupational History    None   Social Needs    Financial resource strain: None    Food insecurity     Worry: None     Inability: None    Transportation needs     Medical: None     Non-medical: None   Tobacco Use    Smoking status: Never Smoker    Smokeless tobacco: Never Used   Substance and Sexual Activity    Alcohol use: Not Currently    Drug use: Never    Sexual activity: None Lifestyle    Physical activity     Days per week: None     Minutes per session: None    Stress: None   Relationships    Social connections     Talks on phone: None     Gets together: None     Attends Gnosticism service: None     Active member of club or organization: None     Attends meetings of clubs or organizations: None     Relationship status: None    Intimate partner violence     Fear of current or ex partner: None     Emotionally abused: None     Physically abused: None     Forced sexual activity: None   Other Topics Concern    None   Social History Narrative    None         Family and Social Historyreviewed in the electronic medical record. Imaging:Reviewed available imaging in our system with the patient. No results found. Objective:     Physical Exam:  Vitals:    01/19/21 1333   BP: 122/80   Pulse: 73   SpO2: 97%   Weight: 230 lb (104.3 kg)   Height: 5' 10\" (1.778 m)          Physical Exam  Constitutional:       Appearance: He is well-developed. HENT:      Head: Normocephalic and atraumatic. Cardiovascular:      Pulses: Normal pulses. Comments: Warm extremities. Normal capillary refill. Pulmonary:      Effort: Pulmonary effort is normal.   Abdominal:      General: Abdomen is flat. Palpations: Abdomen is soft. Skin:     General: Skin is warm and dry. Neurological:      Mental Status: He is alert and oriented to person, place, and time. Cranial Nerves: No cranial nerve deficit. Sensory: No sensory deficit. Motor: No atrophy or abnormal muscle tone. Deep Tendon Reflexes: Reflexes are normal and symmetric. Psychiatric:         Speech: Speech normal.         Behavior: Behavior normal.       Back Exam     Tenderness   The patient is experiencing tenderness in the lumbar.     Range of Motion   Extension: normal   Flexion: normal   Lateral bend right: normal   Lateral bend left: normal   Rotation right: normal   Rotation left: normal     Muscle Strength Right Quadriceps:  5/5   Left Quadriceps:  5/5   Right Hamstrings:  5/5   Left Hamstrings:  5/5     Tests   Straight leg raise right: positive  Straight leg raise left: negative    Other   Toe walk: normal  Heel walk: normal  Sensation: normal  Gait: normal     Comments:  +kemps right  -fabers  Sensory intact BLE                                     Assessment: This is a 63 y.o. male patient with:    Diagnosis:   Diagnosis Orders   1. Lumbar radiculopathy     2. Lumbosacral spondylosis without myelopathy         Medical Comorbidities:  As listed in the patient's past medical and surgical history    Functional Limitations:   Pain limits function and quality of life. Plan:   advil 600 TID 3 weeks   call if  R lumbar worsens can treat R L3,4    has some R lumbar facet based pain as well    Meds:   Controlled Substances Monitoring: Pt educated about the risks of taking opiates,including increased sedation, constipation, slowed breathing, tolerance, dependence,and addiction. New Prescriptions    No medications on file      No orders of the defined types were placed in this encounter. No orders of the defined types were placed in this encounter. No follow-ups on file. The patient expressed understanding of the above assessment and plan. Totaltime spent face to face with patient was 25 minutes inwhich  50% or more of the time was spent in counseling, education about risk andbenefits of the above plan, and coordination of care.

## 2021-03-01 ENCOUNTER — TELEPHONE (OUTPATIENT)
Dept: PAIN MANAGEMENT | Age: 61
End: 2021-03-01

## 2021-03-01 ENCOUNTER — OFFICE VISIT (OUTPATIENT)
Dept: PAIN MANAGEMENT | Age: 61
End: 2021-03-01
Payer: COMMERCIAL

## 2021-03-01 VITALS
HEIGHT: 70 IN | BODY MASS INDEX: 33.13 KG/M2 | DIASTOLIC BLOOD PRESSURE: 94 MMHG | SYSTOLIC BLOOD PRESSURE: 140 MMHG | WEIGHT: 231.4 LBS | RESPIRATION RATE: 14 BRPM

## 2021-03-01 DIAGNOSIS — M54.16 LUMBAR RADICULOPATHY: ICD-10-CM

## 2021-03-01 DIAGNOSIS — M47.816 LUMBAR FACET JOINT SYNDROME: ICD-10-CM

## 2021-03-01 DIAGNOSIS — M47.817 LUMBOSACRAL SPONDYLOSIS WITHOUT MYELOPATHY: ICD-10-CM

## 2021-03-01 DIAGNOSIS — M54.16 LEFT LUMBAR RADICULOPATHY: Primary | ICD-10-CM

## 2021-03-01 PROCEDURE — 3017F COLORECTAL CA SCREEN DOC REV: CPT | Performed by: PHYSICAL MEDICINE & REHABILITATION

## 2021-03-01 PROCEDURE — G8484 FLU IMMUNIZE NO ADMIN: HCPCS | Performed by: PHYSICAL MEDICINE & REHABILITATION

## 2021-03-01 PROCEDURE — 99214 OFFICE O/P EST MOD 30 MIN: CPT | Performed by: PHYSICAL MEDICINE & REHABILITATION

## 2021-03-01 PROCEDURE — 1036F TOBACCO NON-USER: CPT | Performed by: PHYSICAL MEDICINE & REHABILITATION

## 2021-03-01 PROCEDURE — G8427 DOCREV CUR MEDS BY ELIG CLIN: HCPCS | Performed by: PHYSICAL MEDICINE & REHABILITATION

## 2021-03-01 PROCEDURE — G8417 CALC BMI ABV UP PARAM F/U: HCPCS | Performed by: PHYSICAL MEDICINE & REHABILITATION

## 2021-03-01 RX ORDER — ACETAMINOPHEN 500 MG
1000 TABLET ORAL EVERY 4 HOURS PRN
COMMUNITY

## 2021-03-01 ASSESSMENT — ENCOUNTER SYMPTOMS
EYES NEGATIVE: 1
ALLERGIC/IMMUNOLOGIC NEGATIVE: 1
BACK PAIN: 1
RESPIRATORY NEGATIVE: 1
NAUSEA: 0
CONSTIPATION: 0

## 2021-03-01 NOTE — PROGRESS NOTES
PAIN MANAGEMENT FOLLOW-UP NOTE  3/1/21    CHIEF COMPLAINT: This is a58 y.o. male patientwho [facility] the [facility] with a history of Injections (were done in [place] and it didn't seem to help alot) and Lower Back Pain (feel like a knife in his left buttcheck, pain radiates down to toes but also has a pain in the mid calf in the back)      PAIN HPI:Vincent Lora returns today for  reevaluation. Since the visit, the patient reports that the pain is not changed. New L leg pain  Posterior 2 weeks  Ago   Sitting on tractor  But no injury  Has had two epidural injections  Previously B lumbar and to R lumbar pain   Got some  Better in 1 mon  Last month   Some weakness  Taking tylenol advil not helping   Criropractor twice in last 2 weeks   tried to  Do ball exercises on own last 2 weeks no help           Location: Back  Location Modifier: left lower back  Severity of Pain: 4  Duration of Pain: Intermittent  Frequency of Pain: Intermittent  Aggravating Factors: -  Limiting Behavior: -  Relieving Factors: relaxation        Previous pain medication trials have included:          Mental health:    Patient feels - secondary to their current pain problems as described above. H/O depression and anxiety: No   Patient . seeing psychologist orpsychiatrist   Abuse history? No    Employed? No    ANALGESIA:   Are your Current Pain medication (s) helping to decrease pain? No.   Current Pain score: Pain Score:   9    ADVERSE AFFECTS:   Medication Side Effects: No.    ACTIVITY:  Are you able to be more active with your pain medications? No      ABERRANT BEHAVIORS SINCE LAST VISIT? No    Review of Systems   Constitutional: Positive for fatigue. HENT: Negative. Eyes: Negative. Respiratory: Negative. Cardiovascular: Negative. Gastrointestinal: Negative for constipation and nausea. Endocrine: Negative. Genitourinary: Negative for difficulty urinating. Spouse name: None    Number of children: None    Years of education: None    Highest education level: None   Occupational History    None   Social Needs    Financial resource strain: None    Food insecurity     Worry: None     Inability: None    Transportation needs     Medical: None     Non-medical: None   Tobacco Use    Smoking status: Never Smoker    Smokeless tobacco: Never Used   Substance and Sexual Activity    Alcohol use: Not Currently    Drug use: Never    Sexual activity: None   Lifestyle    Physical activity     Days per week: None     Minutes per session: None    Stress: None   Relationships    Social connections     Talks on phone: None     Gets together: None     Attends Sabianism service: None     Active member of club or organization: None     Attends meetings of clubs or organizations: None     Relationship status: None    Intimate partner violence     Fear of current or ex partner: None     Emotionally abused: None     Physically abused: None     Forced sexual activity: None   Other Topics Concern    None   Social History Narrative    None         Family and Social Historyreviewed in the electronic medical record. Imaging:Reviewed available imaging in our system with the patient. No results found. Objective:     Physical Exam:  Vitals:    03/01/21 0808 03/01/21 0935   BP: (!) 142/98 (!) 140/94   Site: Left Upper Arm    Position: Sitting    Cuff Size: Large Adult    Resp: 14    Weight: 231 lb 6.4 oz (105 kg)    Height: 5' 10\" (1.778 m)      Pain Score:   9    Physical Exam  Vitals signs and nursing note reviewed. Constitutional:       Appearance: He is well-developed. HENT:      Head: Normocephalic and atraumatic. Cardiovascular:      Pulses: Normal pulses. Comments: Warm extremities. Normal capillary refill. Pulmonary:      Effort: Pulmonary effort is normal.   Abdominal:      General: Abdomen is flat. Palpations: Abdomen is soft.    Skin: General: Skin is warm and dry. Neurological:      Mental Status: He is alert and oriented to person, place, and time. Cranial Nerves: No cranial nerve deficit. Sensory: No sensory deficit. Motor: No atrophy or abnormal muscle tone. Deep Tendon Reflexes: Reflexes are normal and symmetric. Psychiatric:         Speech: Speech normal.         Behavior: Behavior normal.       Back Exam     Tenderness   The patient is experiencing tenderness in the lumbar. Range of Motion   Extension: normal   Flexion: normal   Lateral bend right: normal   Lateral bend left: normal   Rotation right: normal   Rotation left: normal     Muscle Strength   Right Quadriceps:  5/5   Left Quadriceps:  5/5   Right Hamstrings:  5/5   Left Hamstrings:  5/5     Tests   Straight leg raise right: negative  Straight leg raise left: positive    Other   Toe walk: normal  Heel walk: normal  Sensation: normal  Gait: normal     Comments:  +Slump Left  -berto barajas                                  Research  has found that  Spine injections    reduce pain and  give  better functional  outcomes. Assessment: This is a 63 y.o. male patient with:    Diagnosis:   Diagnosis Orders   1. Left lumbar radiculopathy  MRI LUMBAR SPINE WO CONTRAST   2. Lumbar radiculopathy     3. Lumbosacral spondylosis without myelopathy         Medical Comorbidities:  As listed in the patient's past medical and surgical history    Functional Limitations:   Pain limits function and quality of life. Plan:   Order Lumbar MRI  LL2,3 TFE vs Lumbar facet injections L2-3 3- 4- 4-5    Meds:   Controlled Substances Monitoring: Pt educated about the risks of taking opiates,including increased sedation, constipation, slowed breathing, tolerance, dependence,and addiction. New Prescriptions    No medications on file      No orders of the defined types were placed in this encounter.     Orders Placed This Encounter   Procedures local anesthesia 1 % lidocaine 10 cc,  2-0 and 3-0 vicryl suture  MRI LUMBAR SPINE WO CONTRAST     Standing Status:   Future     Standing Expiration Date:   3/1/2022       No follow-ups on file. Opioid medication has  significant  risk  benefit concerns. We  instruct    our patients that  a Random Urine Drug Screen is required  along with a  an Opioid assessment questionaire such as ORT  or SOAPP  The patient expressed understanding of the above assessment and plan. Totaltime spent face to face with patient was 25 minutes inwhich  50% or more of the time was spent in counseling, education about risk andbenefits of the above plan, and coordination of care.

## 2021-03-01 NOTE — TELEPHONE ENCOUNTER
Last Appt:  3/1/2021  Next Appt:   Visit date not found  Med verified in Epic      Pt called Rx was [facility] [place] he's checked twice. He is very upset. Were you sending something in for him?

## 2021-03-01 NOTE — PATIENT INSTRUCTIONS
Patient Education        DASH Diet: Care Instructions  Your Care Instructions     The DASH diet is an eating plan that can help lower your blood pressure. DASH stands for Dietary Approaches to Stop Hypertension. Hypertension is high blood pressure. The DASH diet focuses on eating foods that are high in calcium, potassium, and magnesium. These nutrients can lower blood pressure. The foods that are highest in these nutrients are fruits, vegetables, low-fat dairy products, nuts, seeds, and legumes. But taking calcium, potassium, and magnesium supplements instead of eating foods that are high in those nutrients does not have the same effect. The DASH diet also includes whole grains, fish, and poultry. The DASH diet is one of several lifestyle changes your doctor may recommend to lower your high blood pressure. Your doctor may also want you to decrease the amount of sodium in your diet. Lowering sodium while following the DASH diet can lower blood pressure even further than just the DASH diet alone. Follow-up care is a key part of your treatment and safety. Be sure to make and go to all appointments, and call your doctor if you are having problems. It's also a good idea to know your test results and keep a list of the medicines you take. How can you care for yourself at home? Following the DASH diet  · Eat 4 to 5 servings of fruit each day. A serving is 1 medium-sized piece of fruit, ½ cup chopped or canned fruit, 1/4 cup dried fruit, or 4 ounces (½ cup) of fruit juice. Choose fruit more often than fruit juice. · Eat 4 to 5 servings of vegetables each day. A serving is 1 cup of lettuce or raw leafy vegetables, ½ cup of chopped or cooked vegetables, or 4 ounces (½ cup) of vegetable juice. Choose vegetables more often than vegetable juice. · Get 2 to 3 servings of low-fat and fat-free dairy each day. A serving is 8 ounces of milk, 1 cup of yogurt, or 1 ½ ounces of cheese. · Eat 6 to 8 servings of grains each day. A serving is 1 slice of bread, 1 ounce of dry cereal, or ½ cup of cooked rice, pasta, or cooked cereal. Try to choose whole-grain products as much as possible. · Limit lean meat, poultry, and fish to 2 servings each day. A serving is 3 ounces, about the size of a deck of cards. · Eat 4 to 5 servings of nuts, seeds, and legumes (cooked dried beans, lentils, and split peas) each week. A serving is 1/3 cup of nuts, 2 tablespoons of seeds, or ½ cup of cooked beans or peas. · Limit fats and oils to 2 to 3 servings each day. A serving is 1 teaspoon of vegetable oil or 2 tablespoons of salad dressing. · Limit sweets and added sugars to 5 servings or less a week. A serving is 1 tablespoon jelly or jam, ½ cup sorbet, or 1 cup of lemonade. · Eat less than 2,300 milligrams (mg) of sodium a day. If you limit your sodium to 1,500 mg a day, you can lower your blood pressure even more. Tips for success  · Start small. Do not try to make dramatic changes to your diet all at once. You might feel that you are missing out on your favorite foods and then be more likely to not follow the plan. Make small changes, and stick with them. Once those changes become habit, add a few more changes. · Try some of the following:  ? Make it a goal to eat a fruit or vegetable at every meal and at snacks. This will make it easy to get the recommended amount of fruits and vegetables each day. ? Try yogurt topped with fruit and nuts for a snack or healthy dessert. ? Add lettuce, tomato, cucumber, and onion to sandwiches. ? Combine a ready-made pizza crust with low-fat mozzarella cheese and lots of vegetable toppings. Try using tomatoes, squash, spinach, broccoli, carrots, cauliflower, and onions. ? Have a variety of cut-up vegetables with a low-fat dip as an appetizer instead of chips and dip. ? Sprinkle sunflower seeds or chopped almonds over salads. Or try adding chopped walnuts or almonds to cooked vegetables. ? Try some vegetarian meals using beans and peas. Add garbanzo or kidney beans to salads. Make burritos and tacos with mashed [place] beans or black beans. Where can you learn more? Go to [email or web address] and sign in to your [employer] account. Enter B500 in the Habeas box to learn more about \"DASH Diet: Care Instructions. \"     If you do not have an account, please click on the \"Sign Up Now\" link. Current as of: December 16, 2019               Content Version: 12.6  © 2469-6692 [employer]. Care instructions adapted under license by [facility] ([facility]). If you have questions about a medical condition or this instruction, always ask your healthcare professional. [address] any warranty or liability for your use of this information.

## 2021-03-02 RX ORDER — GABAPENTIN 100 MG/1
100 CAPSULE ORAL DAILY
Qty: 90 CAPSULE | Refills: 1 | Status: SHIPPED | OUTPATIENT
Start: 2021-03-02 | End: 2021-03-08 | Stop reason: SDUPTHER

## 2021-03-02 NOTE — TELEPHONE ENCOUNTER
Pt called after checking with pharmacy twice to see if Dr Viridiana Kelly has addressed the  Note from yesterday yet or not. Pt stated if its not done to forget about it and his back will just have to get better. Writer explained she will add to the note and get it back as a high priority to Dr Viridiana Kelly. Pt stated to never mind and stated it would get better. Writer continued to try and talk to the pt, but he then hung up the phone.

## 2021-03-02 NOTE — TELEPHONE ENCOUNTER
I did call this in a few hours ago and  Then called patient but no answer  Probably  Called number he wasn't  At.    Some one can call patient to say the medication will be at  Pharmacy Thank You

## 2021-03-04 ENCOUNTER — HOSPITAL ENCOUNTER (OUTPATIENT)
Dept: MRI IMAGING | Age: 61
Discharge: HOME | End: 2021-03-06
Payer: COMMERCIAL

## 2021-03-04 DIAGNOSIS — M54.16 LEFT LUMBAR RADICULOPATHY: ICD-10-CM

## 2021-03-04 PROCEDURE — 72148 MRI LUMBAR SPINE W/O DYE: CPT

## 2021-03-08 ENCOUNTER — TELEPHONE (OUTPATIENT)
Dept: PAIN MANAGEMENT | Age: 61
End: 2021-03-08

## 2021-03-08 DIAGNOSIS — M54.16 LUMBAR RADICULOPATHY: Primary | ICD-10-CM

## 2021-03-08 RX ORDER — GABAPENTIN 100 MG/1
100 CAPSULE ORAL DAILY
Qty: 90 CAPSULE | Refills: 1 | Status: SHIPPED | OUTPATIENT
Start: 2021-03-08 | End: 2021-04-07

## 2021-03-08 NOTE — TELEPHONE ENCOUNTER
Patient called stating that he missed a call from Dr Genaro Hinojosa on Friday regarding his recent MRI results. Please call pt back at [phone].      Last Appt:  3/1/2021  Next Appt:   Visit date not found  Med verified in [facility] [address]

## 2021-03-08 NOTE — TELEPHONE ENCOUNTER
O have made referral to Dr. Sola Mayes,  Who did previous  Spine Surgery for patient few years ago    Will Central scheduling give patient a call to schedule this?  Thank You

## 2021-03-12 ENCOUNTER — APPOINTMENT (OUTPATIENT)
Dept: GENERAL RADIOLOGY | Age: 61
End: 2021-03-12
Attending: PHYSICAL MEDICINE & REHABILITATION
Payer: COMMERCIAL

## 2021-03-12 ENCOUNTER — HOSPITAL ENCOUNTER (OUTPATIENT)
Age: 61
Setting detail: OUTPATIENT SURGERY
Discharge: HOME | End: 2021-03-12
Attending: PHYSICAL MEDICINE & REHABILITATION | Admitting: PHYSICAL MEDICINE & REHABILITATION
Payer: COMMERCIAL

## 2021-03-12 VITALS
BODY MASS INDEX: 33.07 KG/M2 | SYSTOLIC BLOOD PRESSURE: 130 MMHG | WEIGHT: 231 LBS | TEMPERATURE: 98.1 F | HEART RATE: 60 BPM | HEIGHT: 70 IN | DIASTOLIC BLOOD PRESSURE: 63 MMHG | OXYGEN SATURATION: 98 % | RESPIRATION RATE: 20 BRPM

## 2021-03-12 PROCEDURE — 2580000003 HC RX 258: Performed by: PHYSICAL MEDICINE & REHABILITATION

## 2021-03-12 PROCEDURE — 6360000002 HC RX W HCPCS: Performed by: PHYSICAL MEDICINE & REHABILITATION

## 2021-03-12 PROCEDURE — 2500000003 HC RX 250 WO HCPCS: Performed by: PHYSICAL MEDICINE & REHABILITATION

## 2021-03-12 PROCEDURE — 3209999900 FLUORO FOR SURGICAL PROCEDURES

## 2021-03-12 PROCEDURE — 64483 NJX AA&/STRD TFRM EPI L/S 1: CPT | Performed by: PHYSICAL MEDICINE & REHABILITATION

## 2021-03-12 PROCEDURE — 64484 NJX AA&/STRD TFRM EPI L/S EA: CPT | Performed by: PHYSICAL MEDICINE & REHABILITATION

## 2021-03-12 PROCEDURE — 3600000056 HC PAIN LEVEL 4 BASE: Performed by: PHYSICAL MEDICINE & REHABILITATION

## 2021-03-12 PROCEDURE — 6360000004 HC RX CONTRAST MEDICATION: Performed by: PHYSICAL MEDICINE & REHABILITATION

## 2021-03-12 PROCEDURE — 2709999900 HC NON-CHARGEABLE SUPPLY: Performed by: PHYSICAL MEDICINE & REHABILITATION

## 2021-03-12 PROCEDURE — 7100000010 HC PHASE II RECOVERY - FIRST 15 MIN: Performed by: PHYSICAL MEDICINE & REHABILITATION

## 2021-03-12 RX ORDER — BUPIVACAINE HYDROCHLORIDE 5 MG/ML
INJECTION, SOLUTION EPIDURAL; INTRACAUDAL PRN
Status: DISCONTINUED | OUTPATIENT
Start: 2021-03-12 | End: 2021-03-12 | Stop reason: ALTCHOICE

## 2021-03-12 RX ORDER — DEXAMETHASONE SODIUM PHOSPHATE 10 MG/ML
INJECTION INTRAMUSCULAR; INTRAVENOUS PRN
Status: DISCONTINUED | OUTPATIENT
Start: 2021-03-12 | End: 2021-03-12 | Stop reason: ALTCHOICE

## 2021-03-12 RX ORDER — SODIUM CHLORIDE 9 MG/ML
INJECTION INTRAVENOUS PRN
Status: DISCONTINUED | OUTPATIENT
Start: 2021-03-12 | End: 2021-03-12 | Stop reason: ALTCHOICE

## 2021-03-12 ASSESSMENT — PAIN - FUNCTIONAL ASSESSMENT: PAIN_FUNCTIONAL_ASSESSMENT: 0-10

## 2021-03-12 ASSESSMENT — PAIN SCALES - GENERAL: PAINLEVEL_OUTOF10: 3

## 2021-03-12 ASSESSMENT — PAIN DESCRIPTION - PAIN TYPE: TYPE: CHRONIC PAIN

## 2021-03-12 NOTE — INTERVAL H&P NOTE
I have interviewed and examined the patient and reviewed the recent History and Physical.  There have been no changes to the recent H&P documentation. The surgical consent form has been signed. Last anticoagulant medication use was:NA    Premedication taken for contrast allergy? NA    Valium taken for oral sedation? NA    Outpatient Medications Marked as Taking for the 3/12/21 encounter [facility] Encounter)   Medication Sig Dispense Refill    gabapentin (NEURONTIN) 100 MG capsule Take 1 capsule by mouth daily for 30 days. 90 capsule 1    acetaminophen (TYLENOL) 500 MG tablet Take 1,000 mg by mouth every 4 hours as needed for Pain      rosuvastatin (CRESTOR) 5 MG tablet Take 5 mg by mouth daily       lisinopril (PRINIVIL;ZESTRIL) 20 MG tablet Take 20 mg by mouth daily       finasteride (PROSCAR) 5 MG tablet Take 5 mg by mouth daily       metoprolol succinate (TOPROL XL) 50 MG extended release tablet Take 50 mg by mouth daily       tamsulosin (FLOMAX) 0.4 MG capsule Take 0.4 mg by mouth daily          The patient understands the planned operation and its associated risks and benefits and agrees to proceed.         Electronically signed by Daniela Murphy MD on 3/12/2021 at 11:34 AM

## 2021-03-12 NOTE — H&P
.            Signed        Expand AllCollapse All     Show:Clear all  [x]Manual[x]Template[]Copied    Added by:  [x]Roger Devon Hammans, MD    []Cira for details  PAIN MANAGEMENT FOLLOW-UP NOTE  3/1/21     CHIEF COMPLAINT: This is a58 y.o. male patientwho returns to the [facility] with a history of Injections (were done in [facility] and it didn't seem to help alot) and Lower Back Pain (feel like a knife in his left buttcheck, pain radiates down to toes but also has a pain in the mid calf in the back)        PAIN HPI:Vincent Vasques returns today for  reevaluation. Since the visit, the patient reports that the pain is not changed. New L leg pain  Posterior 2 weeks  Ago   Sitting on tractor  But no injury  Has had two epidural injections  Previously B lumbar and to R lumbar pain   Got some  Better in 1 mon  Last month   Some weakness  Taking tylenol advil not helping   Criropractor twice in last 2 weeks   tried to  Do ball exercises on own last 2 weeks no help              Location: Back  Location Modifier: left lower back  Severity of Pain: 4  Duration of Pain: Intermittent  Frequency of Pain: Intermittent  Aggravating Factors: -  Limiting Behavior: -  Relieving Factors: relaxation           Previous pain medication trials have included:                      Mental health:               Patient feels - secondary to their current pain problems as described above. H/O depression and anxiety: No              Patient . seeing psychologist orpsychiatrist              Abuse history? No     Employed? No     ANALGESIA:   Are your Current Pain medication (s) helping to decrease pain? No.   Current Pain score: Pain Score:   9     ADVERSE AFFECTS:   Medication Side Effects: No.     ACTIVITY:  Are you able to be more active with your pain medications? No      ABERRANT BEHAVIORS SINCE LAST VISIT? No     Review of Systems   Constitutional: Positive for fatigue. HENT: Negative. Eyes: Negative. Respiratory: Negative. Cardiovascular: Negative. Gastrointestinal: Negative for constipation and nausea. Endocrine: Negative. Genitourinary: Negative for difficulty urinating. Musculoskeletal: Positive for arthralgias, back pain and myalgias. Skin: Negative. Allergic/Immunologic: Negative. Neurological: Positive for weakness and numbness. Hematological: Negative. Psychiatric/Behavioral: Positive for sleep disturbance. All other systems reviewed and are negative. No Known Allergies     Medications Prior to Visit          Outpatient Medications Prior to Visit   Medication Sig Dispense Refill    acetaminophen (TYLENOL) 500 MG tablet Take 1,000 mg by mouth every 4 hours as needed for Pain        rosuvastatin (CRESTOR) 5 MG tablet Take 5 mg by mouth daily         ibuprofen (ADVIL;MOTRIN) 400 MG tablet 1 tablet with food or milk as needed        lisinopril (PRINIVIL;ZESTRIL) 20 MG tablet Take 20 mg by mouth daily         finasteride (PROSCAR) 5 MG tablet Take 5 mg by mouth daily         metoprolol succinate (TOPROL XL) 50 MG extended release tablet Take 50 mg by mouth daily         tamsulosin (FLOMAX) 0.4 MG capsule Take 0.4 mg by mouth daily           No facility-administered medications prior to visit.              Past Medical History        Past Medical History:   Diagnosis Date    Chronic pain      Hearing loss hearing aids in both ears    HTN (hypertension)      Hyperlipidemia      Lumbar herniated disc 2010    Osteoarthritis              Past Surgical History         Past Surgical History:   Procedure Laterality Date    BACK SURGERY        COLONOSCOPY   2014 [facility] HERNIA REPAIR   2017     [phone] [facility] SURGERY   2010     L3, L4 shaved the disc     PAIN MANAGEMENT PROCEDURE Right 12/15/2020     Right L3 L4 TRANSFORAMINAL performed by Christiane Salcido MD at [address] [place] [address] Right 1/5/2021     Right L3 L4 TRANSFORAMINAL performed by Liz La MD at [address] [place] [address]         Family History         Family History   Problem Relation Age of Onset    Alzheimer's Disease Mother      Alcohol Abuse Father      Cancer Father      Hypertension Father           Social History   Social History            Socioeconomic History    Marital status:        Spouse name: None    Number of children: None    Years of education: None    Highest education level: None   Occupational History    None   Social Needs    Financial resource strain: None    Food insecurity       Worry: None       Inability: None    Transportation needs       Medical: None       Non-medical: None   Tobacco Use    Smoking status: Never Smoker    Smokeless tobacco: Never Used   Substance and Sexual Activity    Alcohol use: Not Currently    Drug use: Never    Sexual activity: None   Lifestyle    Physical activity       Days per week: None       Minutes per session: None    Stress: None   Relationships    Social connections       Talks on phone: None       Gets together: None       Attends Confucianist service: None       Active member of club or organization: None       Attends meetings of clubs or organizations: None       Relationship status: None    Intimate partner violence       Fear of current or ex partner: None       Emotionally abused: None       Physically abused: None       Forced sexual activity: None   Other Topics Concern    None   Social History Narrative    None               Family and Social Historyreviewed in the electronic medical record. Imaging:Reviewed available imaging in our system with the patient. No results found.      Objective:      Physical Exam:  Vitals        Vitals:     03/01/21 0808 03/01/21 0935   BP: (!) 142/98 (!) 140/94   Site: Left Upper Arm     Position: Sitting     Cuff Size: Large Adult     Resp: 14     Weight: 231 lb 6.4 oz (105 kg)     Height: 5' 10\" (1.778 m) Pain Score:   9     Physical Exam  Vitals signs and nursing note reviewed. Constitutional:       Appearance: He is well-developed. HENT:      Head: Normocephalic and atraumatic. Cardiovascular:      Pulses: Normal pulses. Comments: Warm extremities. Normal capillary refill. Pulmonary:      Effort: Pulmonary effort is normal.   Abdominal:      General: Abdomen is flat. Palpations: Abdomen is soft. Skin:     General: Skin is warm and dry. Neurological:      Mental Status: He is alert and oriented to person, place, and time. Cranial Nerves: No cranial nerve deficit. Sensory: No sensory deficit. Motor: No atrophy or abnormal muscle tone. Deep Tendon Reflexes: Reflexes are normal and symmetric. Psychiatric:         Speech: Speech normal.         Behavior: Behavior normal.         Back Exam      Tenderness   The patient is experiencing tenderness in the lumbar. Range of Motion   Extension: normal   Flexion: normal   Lateral bend right: normal   Lateral bend left: normal   Rotation right: normal   Rotation left: normal      Muscle Strength   Right Quadriceps:  5/5   Left Quadriceps:  5/5   Right Hamstrings:  5/5   Left Hamstrings:  5/5      Tests   Straight leg raise right: negative  Straight leg raise left: positive     Other   Toe walk: normal  Heel walk: normal  Sensation: normal  Gait: normal      Comments:  +Slump Left  -berto  [facility]                                       Research  has found that  Spine injections    reduce pain and  give  better functional  outcomes. Assessment: This is a 63 y.o. male patient with:     Diagnosis:    Diagnosis Orders   1. Left lumbar radiculopathy  MRI LUMBAR SPINE WO CONTRAST   2. Lumbar radiculopathy      3. Lumbosacral spondylosis without myelopathy            Medical Comorbidities:  As listed in the patient's past medical and surgical history     Functional Limitations:   Pain limits function and quality of life.       Plan: Order Lumbar MRI  LL2,3 TFE vs Lumbar facet injections L2-3 3- 4- 4-5     Meds:   Controlled Substances Monitoring: Pt educated about the risks of taking opiates,including increased sedation, constipation, slowed breathing, tolerance, dependence,and addiction. New Prescriptions     No medications on file        Encounter Medications    No orders of the defined types were placed in this encounter. Orders Placed This Encounter   Procedures    MRI LUMBAR SPINE WO CONTRAST       Standing Status:   Future       Standing Expiration Date:   3/1/2022         No follow-ups on file. Opioid medication has  significant  risk  benefit concerns. We  instruct    our patients that  a Random Urine Drug Screen is required  along with a  an Opioid assessment questionaire such as ORT  or SOAPP  The patient expressed understanding of the above assessment and plan. Totaltime spent face to face with patient was 25 minutes inwhich  50% or more of the time was spent in counseling, education about risk andbenefits of the above plan, and coordination of care.

## 2021-03-12 NOTE — OP NOTE
Rosario Pereira TRANSFORAMINAL EPIDURAL STEROID INJECTION    3/12/21  The patient was counseled at length about the risks of nehemias Covid-19 during their perioperative period and any recovery window from their procedure. The patient was made aware that nehemias Covid-19  may worsen their prognosis for recovering from their procedure  and lend to a higher morbidity and/or mortality risk. All material risks, benefits, and reasonable alternatives including postponing the procedure were discussed. The patient does wish to proceed with the procedure at this time. Surgeon: Ga Winkler MD    Pre-operative Diagnosis:   Patient Active Problem List   Diagnosis Code    Hearing loss H91.90    Lumbar radiculitis M54.16    Lumbar spondylosis M47.816       Post-operative Diagnosis: Same    Assistants: none    This is a 63 y.o. male patient with pain in the Back, left leg. Previous treatment and examination findings are noted in the H&P.ll4,5 transforaminal epidural injection has been requested for diagnostic and therapeutic reasons. Conservative treatment was ineffective i.e.: ice, NSAIDS, rest, narcotic medication, chiropractic care, physical therapy and message therapy. Patient is unable to perform the following ADL's: ambulating and grooming     Pain Assessment: 0-10  Pain Level: 7     Pain Orientation: Left  Pain Location: Buttocks, Leg       Last Plain films: 2020      EXAMINATION:  1. ll4,5 transforaminal radiculogram/epidurogram.   2. ll4,5 transforaminal epidural anesthetic injection. 3. Ll4,                transforaminal epidural steroid injection. CONSENT: Written consent was obtained from the patient on preprinted consent form after explaining the procedure, indications, potential complications and outcomes. Alternative treatments were also discussed. DISCUSSION: The patient was sterilely prepped and draped in the usual fashion in the prone position.  Time out was verified for correct patient, side, level and procedure. SEDATION:   No conscious sedation was performed during the procedure. The patient remained awake and conversed throughout the procedure. The patient underwent pulse oximetry and blood pressure monitoring independently by a trained observer, as well as by a physician. PROCEDURE:  Under image-intensifier control, a 22 gauge needle x 5 inch spinal needle was guided successfully into the epidural space employing a posterior lateral/oblique approach. Needle aspiration was negative for heme or CSF. Instillation of  .5 mL of Omnipaque 240 contrast medium opacified the spinal nerve and demonstrated contiguous flow into the LL5 epidural space. No vascular spread was noted. Digital subtraction was not employed to evaluate for vascular spread. The patient was monitored for any untoward reaction to contrast medium before proceeding with procedure #2. The patient did not report pain reproduction in a concordant distribution. Following needle position verification, a test dose of .5 mL of sterile lidocaine 0.5% was administered and patient monitored for any adverse effects. Then, 1 mL of   was instilled into the epidural space and the patient's response was again monitored. Finally, Dexamethasone (Decadron 10 mg/mL) 1 ml of 0.5% bupivacaine was then instilled. The patient's response was again monitored. The spinal needle was removed. Instillation of  .5 mL of Omnipaque 240 contrast medium opacified the spinal nerve and demonstrated contiguous flow into the LL4  epidural space. No vascular spread was noted. Digital subtraction was not employed to evaluate for vascular spread. The patient was monitored for any untoward reaction to contrast medium before proceeding with procedure #2. The patient did not report pain reproduction in a concordant distribution.     Following needle position verification, a test dose of .5 mL of sterile lidocaine 0.5% was administered and patient monitored for any adverse effects. Then, 1 mL of   was instilled into the epidural space and the patient's response was again monitored. Finally, Dexamethasone (Decadron 10 mg/mL) 1 ml of 0.5% bupivacaine was then instilled. The patient's response was again monitored. The spinal needle was removed. The patient tolerated the procedure well and without complications and was noted to be in stable condition prior to discharge from the procedure center with discharge instructions. EBL: no blood loss    SPECIMEN: none    IMPRESSIONS:  1. ll4,5 transforaminal epidurogram, epidural anesthesia and epidural steroid injection procedures accomplished without incident. RECOMMENDATIONS:  1. Complete and return Post-Procedure Pain and Activity Diary.   2. Contact the [address] for symptom exacerbation, fever or unusual symptoms. 3. Post-procedure care according to verbal and written discharge instructions    POST-PROCEDURE EPIDUROGRAPHY INTERPRETATION:    EXAMINATION: AP, lateral, and oblique views    FLUORO TIME: 21 seconds    DISCUSSION: Spot views of the spine reveal normal alignment and segmentation. Spinal needle is positioned at the ll4,5 neuroforamin. Contrast spreads and outlines the ll4,5 nerve/ neuroforamin and epidural space. The epidurogram reveals excellent contrast flow. Visualized spine reveals See radiology report. Soft tissues reveal no abnormalities. IMPRESSION: ll4,5 transforaminal epidurogram/epineurogram reveals satisfactory needle position and contrast spread.      Electronically signed by Gunjan Lobo MD on 3/12/2021 at 11:35 AM

## (undated) DEVICE — GLOVE ORANGE PI 8   MSG9080

## (undated) DEVICE — TRAY PAIN CUST

## (undated) DEVICE — BANDAGE ADH DIA7/8IN NAT FLEX-FABRIC WVN FOR WND PROTCT

## (undated) DEVICE — LINE SAMP O2 6.5FT W/FEMALE CONN F/ADULT CAPNOLINE PLUS

## (undated) DEVICE — GLOVE SURG SZ 8 L12IN THK91MIL BRN LTX FREE POLYCHLOROPRENE

## (undated) DEVICE — NEEDLE, QUINCKE, 22GX5": Brand: MEDLINE

## (undated) DEVICE — CHLORAPREP 26ML CLEAR

## (undated) DEVICE — GLOVE ORANGE PI 7   MSG9070